# Patient Record
Sex: FEMALE | Race: WHITE | Employment: OTHER | URBAN - METROPOLITAN AREA
[De-identification: names, ages, dates, MRNs, and addresses within clinical notes are randomized per-mention and may not be internally consistent; named-entity substitution may affect disease eponyms.]

---

## 2017-01-24 PROBLEM — E83.52 HYPERCALCEMIA: Status: ACTIVE | Noted: 2017-01-24

## 2017-05-16 ENCOUNTER — HOSPITAL ENCOUNTER (OUTPATIENT)
Dept: MAMMOGRAPHY | Age: 62
Discharge: HOME | End: 2017-05-16
Attending: INTERNAL MEDICINE
Payer: MEDICARE

## 2017-05-16 DIAGNOSIS — Z78.0 POSTMENOPAUSAL: ICD-10-CM

## 2017-05-16 DIAGNOSIS — Z12.31 ENCOUNTER FOR SCREENING MAMMOGRAM FOR MALIGNANT NEOPLASM OF BREAST: ICD-10-CM

## 2017-05-16 PROCEDURE — 77080 DXA BONE DENSITY AXIAL: CPT

## 2017-05-16 PROCEDURE — 77067 SCR MAMMO BI INCL CAD: CPT

## 2017-05-22 PROBLEM — E55.9 VITAMIN D DEFICIENCY: Status: ACTIVE | Noted: 2017-05-22

## 2018-03-01 ENCOUNTER — HOSPITAL ENCOUNTER (OUTPATIENT)
Dept: LAB | Age: 63
Discharge: HOME | End: 2018-03-01
Payer: MEDICARE

## 2018-03-01 DIAGNOSIS — E55.9 VITAMIN D DEFICIENCY: ICD-10-CM

## 2018-03-01 DIAGNOSIS — E03.9 PRIMARY HYPOTHYROIDISM: ICD-10-CM

## 2018-03-01 DIAGNOSIS — E21.0 PRIMARY HYPERPARATHYROIDISM (HCC): ICD-10-CM

## 2018-03-01 PROBLEM — N18.30 STAGE 3 CHRONIC KIDNEY DISEASE (HCC): Status: ACTIVE | Noted: 2018-03-01

## 2018-03-01 PROBLEM — N20.0 KIDNEY STONES: Status: ACTIVE | Noted: 2018-03-01

## 2018-03-01 LAB
ALBUMIN SERPL-MCNC: 3.3 G/DL (ref 3.2–4.6)
ALBUMIN/GLOB SERPL: 0.6 {RATIO}
ALP SERPL-CCNC: 160 U/L (ref 50–136)
ALT SERPL-CCNC: 17 U/L (ref 12–65)
ANION GAP SERPL CALC-SCNC: 6 MMOL/L
AST SERPL-CCNC: 16 U/L (ref 15–37)
BILIRUB SERPL-MCNC: 0.4 MG/DL (ref 0.2–1.1)
BUN SERPL-MCNC: 39 MG/DL (ref 8–23)
CA-I BLD-MCNC: 5.52 MG/DL (ref 4–5.2)
CALCIUM SERPL-MCNC: 10.1 MG/DL (ref 8.3–10.4)
CALCIUM SERPL-MCNC: 10.1 MG/DL (ref 8.3–10.4)
CHLORIDE SERPL-SCNC: 103 MMOL/L (ref 98–107)
CO2 SERPL-SCNC: 28 MMOL/L (ref 21–32)
CREAT SERPL-MCNC: 1.8 MG/DL (ref 0.6–1)
GLOBULIN SER CALC-MCNC: 5.2 G/DL
GLUCOSE SERPL-MCNC: 132 MG/DL (ref 65–100)
PHOSPHATE SERPL-MCNC: 2.9 MG/DL (ref 2.3–3.7)
POTASSIUM SERPL-SCNC: 4.5 MMOL/L (ref 3.5–5.1)
PROT SERPL-MCNC: 8.5 G/DL (ref 6.3–8.2)
PTH-INTACT SERPL-MCNC: 278.4 PG/ML (ref 14–72)
SODIUM SERPL-SCNC: 137 MMOL/L (ref 136–145)
T4 FREE SERPL-MCNC: 1.6 NG/DL (ref 0.78–1.46)
TSH W FREE THYROID I,TSHELE: 13.8 UIU/ML

## 2018-03-01 PROCEDURE — 82306 VITAMIN D 25 HYDROXY: CPT | Performed by: INTERNAL MEDICINE

## 2018-03-01 PROCEDURE — 83970 ASSAY OF PARATHORMONE: CPT | Performed by: INTERNAL MEDICINE

## 2018-03-01 PROCEDURE — 36415 COLL VENOUS BLD VENIPUNCTURE: CPT | Performed by: INTERNAL MEDICINE

## 2018-03-01 PROCEDURE — 80053 COMPREHEN METABOLIC PANEL: CPT | Performed by: INTERNAL MEDICINE

## 2018-03-01 PROCEDURE — 84100 ASSAY OF PHOSPHORUS: CPT | Performed by: INTERNAL MEDICINE

## 2018-03-01 PROCEDURE — 84443 ASSAY THYROID STIM HORMONE: CPT | Performed by: INTERNAL MEDICINE

## 2018-03-01 PROCEDURE — 84439 ASSAY OF FREE THYROXINE: CPT | Performed by: INTERNAL MEDICINE

## 2018-03-01 PROCEDURE — 82330 ASSAY OF CALCIUM: CPT | Performed by: INTERNAL MEDICINE

## 2018-03-02 LAB — 25(OH)D3+25(OH)D2 SERPL-MCNC: 32.6 NG/ML (ref 30–100)

## 2018-03-21 ENCOUNTER — HOSPITAL ENCOUNTER (OUTPATIENT)
Dept: ULTRASOUND IMAGING | Age: 63
Discharge: HOME | End: 2018-03-21
Attending: INTERNAL MEDICINE
Payer: MEDICARE

## 2018-03-21 ENCOUNTER — HOSPITAL ENCOUNTER (OUTPATIENT)
Dept: NUCLEAR MEDICINE | Age: 63
Discharge: HOME | End: 2018-03-21
Attending: INTERNAL MEDICINE
Payer: MEDICARE

## 2018-03-21 DIAGNOSIS — E21.0 PRIMARY HYPERPARATHYROIDISM (HCC): ICD-10-CM

## 2018-03-21 PROCEDURE — 76536 US EXAM OF HEAD AND NECK: CPT

## 2018-03-21 PROCEDURE — 78070 PARATHYROID PLANAR IMAGING: CPT

## 2018-03-22 PROBLEM — Z85.42 HX OF CANCER OF UTERUS: Status: ACTIVE | Noted: 2018-03-22

## 2018-03-26 PROBLEM — E11.21 TYPE 2 DIABETES WITH NEPHROPATHY (HCC): Status: ACTIVE | Noted: 2018-03-26

## 2018-04-11 ENCOUNTER — HOSPITAL ENCOUNTER (OUTPATIENT)
Dept: MRI IMAGING | Age: 63
Discharge: HOME | End: 2018-04-11
Attending: OTOLARYNGOLOGY
Payer: MEDICARE

## 2018-04-11 VITALS — WEIGHT: 267 LBS | BODY MASS INDEX: 50.45 KG/M2

## 2018-04-11 DIAGNOSIS — E21.3 HYPERPARATHYROIDISM (HCC): ICD-10-CM

## 2018-04-11 DIAGNOSIS — D35.1 PARATHYROID ADENOMA: ICD-10-CM

## 2018-04-11 PROCEDURE — A9577 INJ MULTIHANCE: HCPCS | Performed by: OTOLARYNGOLOGY

## 2018-04-11 PROCEDURE — 74011250636 HC RX REV CODE- 250/636: Performed by: OTOLARYNGOLOGY

## 2018-04-11 PROCEDURE — 70543 MRI ORBT/FAC/NCK W/O &W/DYE: CPT

## 2018-04-11 RX ORDER — SODIUM CHLORIDE 0.9 % (FLUSH) 0.9 %
10 SYRINGE (ML) INJECTION
Status: COMPLETED | OUTPATIENT
Start: 2018-04-11 | End: 2018-04-11

## 2018-04-11 RX ADMIN — GADOBENATE DIMEGLUMINE 20 ML: 529 INJECTION, SOLUTION INTRAVENOUS at 12:47

## 2018-04-11 RX ADMIN — Medication 10 ML: at 12:47

## 2018-05-07 ENCOUNTER — ANESTHESIA EVENT (OUTPATIENT)
Dept: SURGERY | Age: 63
End: 2018-05-07
Payer: MEDICARE

## 2018-05-08 ENCOUNTER — HOSPITAL ENCOUNTER (OUTPATIENT)
Age: 63
Setting detail: OUTPATIENT SURGERY
Discharge: HOME | End: 2018-05-08
Attending: OTOLARYNGOLOGY | Admitting: OTOLARYNGOLOGY
Payer: MEDICARE

## 2018-05-08 ENCOUNTER — ANESTHESIA (OUTPATIENT)
Dept: SURGERY | Age: 63
End: 2018-05-08
Payer: MEDICARE

## 2018-05-08 VITALS
HEART RATE: 78 BPM | HEIGHT: 61 IN | OXYGEN SATURATION: 99 % | RESPIRATION RATE: 18 BRPM | BODY MASS INDEX: 50.41 KG/M2 | DIASTOLIC BLOOD PRESSURE: 77 MMHG | WEIGHT: 267 LBS | SYSTOLIC BLOOD PRESSURE: 184 MMHG | TEMPERATURE: 98.6 F

## 2018-05-08 LAB
ATRIAL RATE: 66 BPM
CALCIUM SERPL-MCNC: 11.7 MG/DL (ref 8.3–10.4)
CALCULATED P AXIS, ECG09: -20 DEGREES
CALCULATED R AXIS, ECG10: 9 DEGREES
CALCULATED T AXIS, ECG11: 7 DEGREES
CREAT SERPL-MCNC: 1.73 MG/DL (ref 0.6–1)
DIAGNOSIS, 93000: NORMAL
GLUCOSE BLD STRIP.AUTO-MCNC: 124 MG/DL (ref 65–100)
HGB BLD-MCNC: 13.2 G/DL (ref 11.7–15.4)
P-R INTERVAL, ECG05: 172 MS
POTASSIUM SERPL-SCNC: 3.9 MMOL/L (ref 3.5–5.1)
PTH, BASELINE, INTRA-OP, IPTH1T: 224.5 PG/ML (ref 18.5–88)
PTH, ZERO TIME, IPTH2T: 26.8 PG/ML (ref 18.5–88)
Q-T INTERVAL, ECG07: 392 MS
QRS DURATION, ECG06: 86 MS
QTC CALCULATION (BEZET), ECG08: 410 MS
SPECIMEN DRAWN SERPL: 1315
SPECIMEN DRAWN SERPL: 1418
VENTRICULAR RATE, ECG03: 66 BPM

## 2018-05-08 PROCEDURE — 77030021678 HC GLIDESCP STAT DISP VERT -B: Performed by: ANESTHESIOLOGY

## 2018-05-08 PROCEDURE — 77030013794 HC KT TRNSDUC BLD EDWD -B: Performed by: ANESTHESIOLOGY

## 2018-05-08 PROCEDURE — 77030019655 HC PRB STIM CRAN MEDT -B: Performed by: OTOLARYNGOLOGY

## 2018-05-08 PROCEDURE — 74011000250 HC RX REV CODE- 250

## 2018-05-08 PROCEDURE — 77030016570 HC BLNKT BAIR HGGR 3M -B: Performed by: ANESTHESIOLOGY

## 2018-05-08 PROCEDURE — 77030002996 HC SUT SLK J&J -A: Performed by: OTOLARYNGOLOGY

## 2018-05-08 PROCEDURE — 77030031139 HC SUT VCRL2 J&J -A: Performed by: OTOLARYNGOLOGY

## 2018-05-08 PROCEDURE — 74011250636 HC RX REV CODE- 250/636: Performed by: OTOLARYNGOLOGY

## 2018-05-08 PROCEDURE — 77030002933 HC SUT MCRYL J&J -A: Performed by: OTOLARYNGOLOGY

## 2018-05-08 PROCEDURE — 76010000172 HC OR TIME 2.5 TO 3 HR INTENSV-TIER 1: Performed by: OTOLARYNGOLOGY

## 2018-05-08 PROCEDURE — 74011250636 HC RX REV CODE- 250/636

## 2018-05-08 PROCEDURE — 74011000250 HC RX REV CODE- 250: Performed by: OTOLARYNGOLOGY

## 2018-05-08 PROCEDURE — 85018 HEMOGLOBIN: CPT | Performed by: ANESTHESIOLOGY

## 2018-05-08 PROCEDURE — 88305 TISSUE EXAM BY PATHOLOGIST: CPT | Performed by: OTOLARYNGOLOGY

## 2018-05-08 PROCEDURE — 88331 PATH CONSLTJ SURG 1 BLK 1SPC: CPT | Performed by: OTOLARYNGOLOGY

## 2018-05-08 PROCEDURE — 76210000021 HC REC RM PH II 0.5 TO 1 HR: Performed by: OTOLARYNGOLOGY

## 2018-05-08 PROCEDURE — 77030002974 HC SUT PLN J&J -A: Performed by: OTOLARYNGOLOGY

## 2018-05-08 PROCEDURE — 93005 ELECTROCARDIOGRAM TRACING: CPT | Performed by: ANESTHESIOLOGY

## 2018-05-08 PROCEDURE — 77030008467 HC STPLR SKN COVD -B: Performed by: OTOLARYNGOLOGY

## 2018-05-08 PROCEDURE — 83970 ASSAY OF PARATHORMONE: CPT | Performed by: OTOLARYNGOLOGY

## 2018-05-08 PROCEDURE — 76060000037 HC ANESTHESIA 3 TO 3.5 HR: Performed by: OTOLARYNGOLOGY

## 2018-05-08 PROCEDURE — 74011250637 HC RX REV CODE- 250/637: Performed by: ANESTHESIOLOGY

## 2018-05-08 PROCEDURE — 82962 GLUCOSE BLOOD TEST: CPT

## 2018-05-08 PROCEDURE — 77030005401 HC CATH RAD ARRO -A: Performed by: ANESTHESIOLOGY

## 2018-05-08 PROCEDURE — 76210000006 HC OR PH I REC 0.5 TO 1 HR: Performed by: OTOLARYNGOLOGY

## 2018-05-08 PROCEDURE — 77030019908 HC STETH ESOPH SIMS -A: Performed by: ANESTHESIOLOGY

## 2018-05-08 PROCEDURE — 77030013292 HC BOWL MX PRSM J&J -A: Performed by: ANESTHESIOLOGY

## 2018-05-08 PROCEDURE — 84132 ASSAY OF SERUM POTASSIUM: CPT | Performed by: ANESTHESIOLOGY

## 2018-05-08 PROCEDURE — 77030020782 HC GWN BAIR PAWS FLX 3M -B: Performed by: ANESTHESIOLOGY

## 2018-05-08 PROCEDURE — 74011250636 HC RX REV CODE- 250/636: Performed by: ANESTHESIOLOGY

## 2018-05-08 PROCEDURE — 82310 ASSAY OF CALCIUM: CPT | Performed by: ANESTHESIOLOGY

## 2018-05-08 PROCEDURE — 82565 ASSAY OF CREATININE: CPT | Performed by: ANESTHESIOLOGY

## 2018-05-08 RX ORDER — SODIUM CHLORIDE 0.9 % (FLUSH) 0.9 %
5-10 SYRINGE (ML) INJECTION AS NEEDED
Status: DISCONTINUED | OUTPATIENT
Start: 2018-05-08 | End: 2018-05-08 | Stop reason: HOSPADM

## 2018-05-08 RX ORDER — LIDOCAINE HYDROCHLORIDE 10 MG/ML
0.1 INJECTION INFILTRATION; PERINEURAL AS NEEDED
Status: DISCONTINUED | OUTPATIENT
Start: 2018-05-08 | End: 2018-05-08 | Stop reason: HOSPADM

## 2018-05-08 RX ORDER — ONDANSETRON 2 MG/ML
INJECTION INTRAMUSCULAR; INTRAVENOUS AS NEEDED
Status: DISCONTINUED | OUTPATIENT
Start: 2018-05-08 | End: 2018-05-08 | Stop reason: HOSPADM

## 2018-05-08 RX ORDER — FENTANYL CITRATE 50 UG/ML
100 INJECTION, SOLUTION INTRAMUSCULAR; INTRAVENOUS ONCE
Status: DISCONTINUED | OUTPATIENT
Start: 2018-05-08 | End: 2018-05-08 | Stop reason: HOSPADM

## 2018-05-08 RX ORDER — HYDROCODONE BITARTRATE AND ACETAMINOPHEN 5; 325 MG/1; MG/1
1 TABLET ORAL AS NEEDED
Status: DISCONTINUED | OUTPATIENT
Start: 2018-05-08 | End: 2018-05-08 | Stop reason: HOSPADM

## 2018-05-08 RX ORDER — ACETAMINOPHEN 500 MG
1000 TABLET ORAL
Status: DISCONTINUED | OUTPATIENT
Start: 2018-05-08 | End: 2018-05-08 | Stop reason: HOSPADM

## 2018-05-08 RX ORDER — CEFAZOLIN SODIUM/WATER 2 G/20 ML
2 SYRINGE (ML) INTRAVENOUS ONCE
Status: COMPLETED | OUTPATIENT
Start: 2018-05-08 | End: 2018-05-08

## 2018-05-08 RX ORDER — SODIUM CHLORIDE, SODIUM LACTATE, POTASSIUM CHLORIDE, CALCIUM CHLORIDE 600; 310; 30; 20 MG/100ML; MG/100ML; MG/100ML; MG/100ML
150 INJECTION, SOLUTION INTRAVENOUS CONTINUOUS
Status: DISCONTINUED | OUTPATIENT
Start: 2018-05-08 | End: 2018-05-08 | Stop reason: HOSPADM

## 2018-05-08 RX ORDER — PROPOFOL 10 MG/ML
INJECTION, EMULSION INTRAVENOUS AS NEEDED
Status: DISCONTINUED | OUTPATIENT
Start: 2018-05-08 | End: 2018-05-08 | Stop reason: HOSPADM

## 2018-05-08 RX ORDER — SODIUM CHLORIDE 9 MG/ML
50 INJECTION, SOLUTION INTRAVENOUS CONTINUOUS
Status: DISCONTINUED | OUTPATIENT
Start: 2018-05-08 | End: 2018-05-08 | Stop reason: HOSPADM

## 2018-05-08 RX ORDER — SODIUM CHLORIDE 0.9 % (FLUSH) 0.9 %
5-10 SYRINGE (ML) INJECTION EVERY 8 HOURS
Status: DISCONTINUED | OUTPATIENT
Start: 2018-05-08 | End: 2018-05-08 | Stop reason: HOSPADM

## 2018-05-08 RX ORDER — LIDOCAINE HYDROCHLORIDE 20 MG/ML
INJECTION, SOLUTION EPIDURAL; INFILTRATION; INTRACAUDAL; PERINEURAL AS NEEDED
Status: DISCONTINUED | OUTPATIENT
Start: 2018-05-08 | End: 2018-05-08 | Stop reason: HOSPADM

## 2018-05-08 RX ORDER — SODIUM CHLORIDE, SODIUM LACTATE, POTASSIUM CHLORIDE, CALCIUM CHLORIDE 600; 310; 30; 20 MG/100ML; MG/100ML; MG/100ML; MG/100ML
150 INJECTION, SOLUTION INTRAVENOUS CONTINUOUS
Status: DISCONTINUED | OUTPATIENT
Start: 2018-05-08 | End: 2018-05-08

## 2018-05-08 RX ORDER — SUCCINYLCHOLINE CHLORIDE 20 MG/ML
INJECTION INTRAMUSCULAR; INTRAVENOUS AS NEEDED
Status: DISCONTINUED | OUTPATIENT
Start: 2018-05-08 | End: 2018-05-08 | Stop reason: HOSPADM

## 2018-05-08 RX ORDER — HYDROMORPHONE HYDROCHLORIDE 2 MG/ML
0.5 INJECTION, SOLUTION INTRAMUSCULAR; INTRAVENOUS; SUBCUTANEOUS
Status: DISCONTINUED | OUTPATIENT
Start: 2018-05-08 | End: 2018-05-08 | Stop reason: HOSPADM

## 2018-05-08 RX ORDER — MIDAZOLAM HYDROCHLORIDE 1 MG/ML
2 INJECTION, SOLUTION INTRAMUSCULAR; INTRAVENOUS
Status: DISCONTINUED | OUTPATIENT
Start: 2018-05-08 | End: 2018-05-08 | Stop reason: HOSPADM

## 2018-05-08 RX ORDER — FENTANYL CITRATE 50 UG/ML
INJECTION, SOLUTION INTRAMUSCULAR; INTRAVENOUS AS NEEDED
Status: DISCONTINUED | OUTPATIENT
Start: 2018-05-08 | End: 2018-05-08 | Stop reason: HOSPADM

## 2018-05-08 RX ORDER — FAMOTIDINE 20 MG/1
20 TABLET, FILM COATED ORAL ONCE
Status: COMPLETED | OUTPATIENT
Start: 2018-05-08 | End: 2018-05-08

## 2018-05-08 RX ORDER — LABETALOL HYDROCHLORIDE 5 MG/ML
INJECTION, SOLUTION INTRAVENOUS AS NEEDED
Status: DISCONTINUED | OUTPATIENT
Start: 2018-05-08 | End: 2018-05-08 | Stop reason: HOSPADM

## 2018-05-08 RX ORDER — SODIUM CHLORIDE 9 MG/ML
75 INJECTION, SOLUTION INTRAVENOUS CONTINUOUS
Status: DISCONTINUED | OUTPATIENT
Start: 2018-05-08 | End: 2018-05-08 | Stop reason: HOSPADM

## 2018-05-08 RX ORDER — LIDOCAINE HYDROCHLORIDE AND EPINEPHRINE 10; 10 MG/ML; UG/ML
INJECTION, SOLUTION INFILTRATION; PERINEURAL AS NEEDED
Status: DISCONTINUED | OUTPATIENT
Start: 2018-05-08 | End: 2018-05-08 | Stop reason: HOSPADM

## 2018-05-08 RX ADMIN — LABETALOL HYDROCHLORIDE 10 MG: 5 INJECTION, SOLUTION INTRAVENOUS at 15:25

## 2018-05-08 RX ADMIN — FENTANYL CITRATE 25 MCG: 50 INJECTION, SOLUTION INTRAMUSCULAR; INTRAVENOUS at 14:50

## 2018-05-08 RX ADMIN — PROPOFOL 200 MG: 10 INJECTION, EMULSION INTRAVENOUS at 12:51

## 2018-05-08 RX ADMIN — SODIUM CHLORIDE 75 ML/HR: 900 INJECTION, SOLUTION INTRAVENOUS at 12:21

## 2018-05-08 RX ADMIN — SUCCINYLCHOLINE CHLORIDE 160 MG: 20 INJECTION INTRAMUSCULAR; INTRAVENOUS at 12:52

## 2018-05-08 RX ADMIN — Medication 2 G: at 13:07

## 2018-05-08 RX ADMIN — LIDOCAINE HYDROCHLORIDE 60 MG: 20 INJECTION, SOLUTION EPIDURAL; INFILTRATION; INTRACAUDAL; PERINEURAL at 12:50

## 2018-05-08 RX ADMIN — FENTANYL CITRATE 100 MCG: 50 INJECTION, SOLUTION INTRAMUSCULAR; INTRAVENOUS at 12:49

## 2018-05-08 RX ADMIN — ONDANSETRON 4 MG: 2 INJECTION INTRAMUSCULAR; INTRAVENOUS at 15:10

## 2018-05-08 RX ADMIN — FAMOTIDINE 20 MG: 20 TABLET ORAL at 11:04

## 2018-05-08 RX ADMIN — MIDAZOLAM HYDROCHLORIDE 2 MG: 1 INJECTION, SOLUTION INTRAMUSCULAR; INTRAVENOUS at 11:42

## 2018-05-08 RX ADMIN — SODIUM CHLORIDE, SODIUM LACTATE, POTASSIUM CHLORIDE, AND CALCIUM CHLORIDE 150 ML/HR: 600; 310; 30; 20 INJECTION, SOLUTION INTRAVENOUS at 10:55

## 2018-05-08 RX ADMIN — LABETALOL HYDROCHLORIDE 10 MG: 5 INJECTION, SOLUTION INTRAVENOUS at 15:27

## 2018-05-08 RX ADMIN — HYDROCODONE BITARTRATE AND ACETAMINOPHEN 1 TABLET: 5; 325 TABLET ORAL at 16:10

## 2018-05-08 NOTE — ANESTHESIA POSTPROCEDURE EVALUATION
Post-Anesthesia Evaluation and Assessment    Patient: Cinda Nunez MRN: [id number]  SSN: [id number]    YOB: 1955  Age: 60 y.o. Sex: female       Cardiovascular Function/Vital Signs  Visit Vitals    /77 (BP 1 Location: Left arm, BP Patient Position: At rest)    Pulse 78    Temp 37 °C (98.6 °F)    Resp 18    Ht 5' 1\" (1.549 m)    Wt 121.1 kg (267 lb)    SpO2 99%    BMI 50.45 kg/m2       Patient is status post general anesthesia for Procedure(s):  PARA THYROIDECTOMY/ NIMS/ ENTRAOPERATIVE PTH MONITORING. Nausea/Vomiting: None    Postoperative hydration reviewed and adequate. Pain:  Pain Scale 1: Numeric (0 - 10) (05/08/18 1622)  Pain Intensity 1: 2 (05/08/18 1622)   Managed    Neurological Status:   Neuro (WDL): Exceptions to WDL (05/08/18 1622)  Neuro  LUE Motor Response: Purposeful (05/08/18 1622)  LLE Motor Response: Weak (05/08/18 1622)  RUE Motor Response: Purposeful (05/08/18 1622)  RLE Motor Response: Weak (05/08/18 1622)   At baseline    Mental Status and Level of Consciousness: Arousable    Pulmonary Status:   O2 Device: Room air (05/08/18 1622)   Adequate oxygenation and airway patent    Complications related to anesthesia: None    Post-anesthesia assessment completed.  No concerns    Signed By: Hair Amaro MD     May 8, 2018

## 2018-05-08 NOTE — H&P
61 yo female w/ recently dx primary HPTH- noted initially to have hypercalcemia on routine blood work- Ca: 10.7. Further worked up by Dr. Jose Negro and her PTH was 278.4 and he dx her w/ primary HPTH. She has noted some fatigue, has h/o nephrolithiasis (one which passed and another req'd lithotripsy), and c/o some abd pain and constipation (although she does have 2 abd hernias). No previous thyroid or parathyroid surgery. She was worked up recently w/ thyroid US and Sestamibi scan. She does have chronic pain and uses a wheelchair. Past Medical History:   Diagnosis Date    Abnormal hemoglobin (Hgb) ([facility]     pt reports hgb of 9 at discharge from [address] [place] [address] 5-15-12; requested records    Adenosquamous carcinoma of uterus ([facility] 11/20/2012    Arthritis     Asthma     uses MDI    Cancer ([facility] [facility] 2010    uterine    Cellulitis     legs    Charcot's joint     left foot    Chronic kidney disease     Stage 3 per patient, followed by PCP--Dr. Gonzalez Wallace    Chronic LBP 11/20/2012    Chronic pain     abdomen, shoulders, feet    Dehydration     May 11,2012 -hospitialized    Depression     Diabetes ([facility]     Diabetes-she stopped all medications spring 2017. A1C has remained <6.5.  Diverticulosis 11/20/2012    Generalized pain 11/20/2012    History of kidney stones     passed stone x 2    Hypothyroidism 11/20/2012    Impaired mobility     Charcot's. Uses motorized wheelchair.  Lymphedema     lower legs    Morbid obesity ([facility]     MRSA infection     back after spinal fusion April 2007 -SFDT    Nerve pain     feet, hands    Peripheral neuropathy 11/20/2012    Renal failure, acute [facility])     hospitalized May 11-15,2012    Restless leg syndrome     medication    Sepsis(995.91)     May, 2012.   Ruptured diverticulum    Sleep apnea 11/20/2012    Thyroid disease     hypothyroid -medication    Unspecified adverse effect of anesthesia     reports difficulty extubating after surgery in 2003--has had surgeries since with no difficulty    Unspecified sleep apnea     c-pap -instruceted to bring per Dr Howard Yates UTI (lower urinary tract infection)     May, 2012    Venous stasis     lower extremity     Past Surgical History:   Procedure Laterality Date    HX APPENDECTOMY      with colostomy revision    HX CARPAL TUNNEL RELEASE  6/5/12    Right - SFE    HX CARPAL TUNNEL RELEASE      HX COLECTOMY  2003    ruptured diverticulum with colostomy with reversal in July    HX COLOSTOMY  03/01/2003    Dr. Ailyn Hayden - [facility]  2010    uterine biopsy    HX HYSTERECTOMY  2010    and BSO dr. Gabriel Swain  2-1-07    MRSA infection, osteomyelitis    HX OPEN CHOLECYSTECTOMY  1986    HX ORTHOPAEDIC  2004    right TKA    HX ORTHOPAEDIC  2005    right ORIF with revision later same year    HX ORTHOPAEDIC  2008    left foot fusion    HX OTHER SURGICAL  2007    spinal fusion     Social History     Social History    Marital status:      Spouse name: N/A    Number of children: 1    Years of education: N/A     Occupational History    Not on file. Social History Main Topics    Smoking status: Former Smoker     Quit date: 6/1/1986    Smokeless tobacco: Never Used      Comment: was a social smoker. Smoked off and on ten years    Alcohol use 0.0 oz/week     0 Standard drinks or equivalent per week      Comment: socially    Drug use: No    Sexual activity: Not on file     Other Topics Concern    Not on file     Social History Narrative    dsiabled RN    Twice     1 son    D/c tob 25                     Family History   Problem Relation Age of Onset    Arthritis-osteo Mother      rheumatoid arthritis    Heart Disease Father     Diabetes Father     Breast Cancer Maternal Aunt 48    Cancer Maternal Aunt      breast    Thyroid Cancer Paternal Aunt      Allergies   Allergen Reactions    Ativan [Lorazepam] Other (comments)     Pt states it made her feel nervous.     Meperidine Anxiety    Tequin [Gatifloxacin] Rash     No current facility-administered medications on file prior to encounter. Current Outpatient Prescriptions on File Prior to Encounter   Medication Sig Dispense Refill    silver sulfADIAZINE (SILVADENE) 1 % topical cream Apply  to affected area daily. 400 g 3    lidocaine (LIDOCAINE VISCOUS) 2 % solution Apply to affected area as needed. 100 mL 3    triamterene-hydroCHLOROthiazide (MAXZIDE) 75-50 mg per tablet Take 0.5 Tabs by mouth daily. 1 Tab 0    nystatin (MYCOSTATIN) topical cream Apply  to affected area two (2) times a day. 90 g 2    oxybutynin chloride XL (DITROPAN XL) 15 mg CR tablet Take 1 Tab by mouth two (2) times a day. 1 Tab 0    levothyroxine (SYNTHROID) 300 mcg tablet Take 1 Tab by mouth Daily (before breakfast). 90 Tab 3    levothyroxine (SYNTHROID) 25 mcg tablet Take 1 Tab by mouth Daily (before breakfast). 90 Tab 3    ergocalciferol (ERGOCALCIFEROL) 50,000 unit capsule Take 1 Cap by mouth Once every 2 weeks. 6 Cap 3    diclofenac (VOLTAREN) 1 % gel Apply 4 g to affected area four (4) times daily. 300 g 3    polyethylene glycol (MIRALAX) 17 gram/dose powder Take 17 g by mouth daily. 1530 g 1    OTHER Two Batteries for an [employer] Powerchair. Dx Continued need for motorized wheechair 2 Each 0    OTHER custom chair cushion for motorized wheelchair. Single Chamber High Profile Roho Cushion 20x18 with Smart Check Capabilities & a cover . Dx:pressure ulcers, charcot joint 1 Each 0    sertraline (ZOLOFT) 50 mg tablet Take 1 Tab by mouth daily. 80 Tab 1    OTHER Diabetic shoes. Dx previous callus with ulcer 1 Each 0    pramipexole (MIRAPEX) 1 mg tablet TAKE TWO TABLETS AT BEDTIME. Indications: Restless Legs Syndrome 180 Tab 1    fluticasone (FLONASE) 50 mcg/actuation nasal spray Use 1-2 sprays in each nostril twice a day 3 Bottle 3    glucose blood VI test strips (FREESTYLE LITE STRIPS) strip Patient test glucose QID.  Dx: E11.9 120 Strip 0    Non-Adherent Bandage (TELFA) 3 X 8 \" bndg Apply 1/2-1 patch over the affected areas after treatment daily 20 Each 2    cpap machine kit by Does Not Apply route.  HYDROcodone-acetaminophen (NORCO)  mg tablet as needed.  albuterol (PROVENTIL HFA, VENTOLIN HFA, PROAIR HFA) 90 mcg/actuation inhaler 1-2 inhalations four times daily. 1 Inhaler 1    mometasone (ASMANEX TWISTHALER) 220 mcg (120 doses) aepb inhaler Take 2 Puffs by inhalation two (2) times a day. 3 Inhaler 0    gabapentin (NEURONTIN) 600 mg tablet Take 1 Tab by mouth four (4) times daily. 360 Tab 3    morphine CR (MS CONTIN) 15 mg CR tablet Take 15 mg by mouth two (2) times a day. EXAM:  Visit Vitals    /75 (BP 1 Location: Left arm, BP Patient Position: At rest)    Pulse 93    Temp 98.8 °F (37.1 °C)    Resp 18    Ht 5' 1\" (1.549 m)    Wt 267 lb (121.1 kg)    SpO2 98%    BMI 50.45 kg/m2     General: NAD, well-appearing. Obese  Neuro: No gross neuro deficits. CN's II-XII intact. No facial weakness. Eyes: EOMI. Pupils reactive. No periorbital edema/ecchymosis. No nystagmus. Skin: No facial erythema, rashes or concerning lesions. Nose: No external deviations or saddling. Intranasally, septum is midline without perforations, nasal mucosa appears healthy with no erythema, mucopurulence, or polyps. Mouth: Moist mucus membranes, normal tongue/palate mobility, no concerning mucosal lesions. Oropharynx clear with no erythema/exudate, no tonsillar hypertrophy. Ears: Normal appearing auricles, no hematomas. EACs clear with no cerumen impaction, healthy canal skin, TM's intact with no perforations or retraction pockets. No middle ear effusions. Neck: Soft, supple, no palpable neck masses. No thyromegaly or palpable thyroid nodules. No surgical scars. Lymphatics: No palpable cervical LAD. Resp: No audible stridor or wheezing.   Extremities: No clubbing or cyanosis.     IMAGING:  I reviewed her recent Sestamibi scan from :     FINDINGS: On immediate imaging symmetric uptake is seen within salivary glands  and thyroid. The suprasternal notch is marked.       On delayed images there is no persisting abnormal activity to suggest a  parathyroid adenoma.       .  IMPRESSION: No evidence of parathyroid adenoma.      Ca: 10.1, 10.7  PTH: 278.4  TSH: 13.8    A/P:  She has signs and sxs of primary HPTH- her Sestamibi scan and MRI were not able to localize an obvious adenoma. I recommend proceeding w/ parathyroidectomy w/ NIMS and intra-operative PTH monitoring. I discussed all the risks of parathyroid surgery including bleeding/hematoma, infection, inability to find the adenoma or hyperplastic glands with continued hypercalcemia, post-op hypocalcemia, and hoarseness, and she would like to proceed.      Cathleen Ramirez MD  [facility]NT

## 2018-05-08 NOTE — OP NOTES
[facility] REPORT    Traci Lob  MR#: [id number]  : 1955  ACCOUNT #: [de-identified]   DATE OF SERVICE: 2018    PREOPERATIVE DIAGNOSIS:  Primary hyperparathyroidism. POSTOPERATIVE DIAGNOSES:  1. Primary hyperparathyroidism. 2.  Left inferior parathyroid adenoma. PROCEDURE:  Parathyroidectomy MD    OPERATIVE SURGEON:  Shanda Herrera MD    ANESTHESIA:  General endotracheal.    ANESTHESIOLOGIST:  BRIAN WRIGHT DO    OPERATIVE FINDINGS:  1. There was a firm and enlarged left inferior parathyroid gland, which was adherent to the left inferior thyroid pole. Frozen section pathology confirmed hypercellular parathyroid tissue. 2.  The intraoperative parathyroid hormone level decreased from 224.5 preoperatively down to 26.8 after excision of this adenoma. 3.  The left superior and right superior parathyroid glands were identified and appeared normal on gross inspection. 4.  Both recurrent laryngeal nerves were identified and carefully preserved. There were no major disruptions with intraoperative nerve monitoring. IV FLUID:  900 mL crystalloid. ESTIMATED BLOOD LOSS:  15 mL. URINE OUTPUT:  400. SPECIMENS:  Question left inferior parathyroid gland -- frozen. DRAINS:  None. COMPLICATIONS:  None. DISPOSITION:  PACU, then home. CONDITION:  Stable. BRIEF HISTORY:  The patient is a 59-year-old female who was referred to my office with a newly diagnosed primary hyperparathyroidism. She was noted to have hypercalcemia on routine blood work and her parathyroid hormone level was elevated as well. Due to her persistent hypercalcemia and symptoms, the decision was made to proceed with a parathyroidectomy. DESCRIPTION OF PROCEDURE:  The patient was brought back to the operating room and placed on the table in a supine position. General endotracheal anesthesia was inducted without complications.   A [employer] recurrent laryngeal nerve monitoring tube was placed and its position was confirmed using the GlideScope. Once the patient was adequately sedated and the neuro monitoring equipment was set up, a total of 6 mL of 1% lidocaine with 1:100,000 epinephrine was injected along the planned incision line. She was then sterilely prepped and draped in the usual fashion. I began by designing a 4 cm incision along a natural neck skin crease approximately one fingerbreadth above the sternal notch. I incised through the skin and dermis with a 15 blade and then dissected through subcutaneous fat and platysma muscle using Bovie electrocautery. Next, superior and inferiorly based subplatysmal skin flaps were raised for improved exposure. I dissected along the midline raphae to lateralize the strap muscles. I dissected down to a very small thyroid isthmus along the anterior tracheal wall. The preoperative imaging studies were not able to localize any obvious adenoma, so I began with the parathyroid exploration. I started on the left side. I used careful dissection to dissect the strap muscles off of the left thyroid lobe. I dissected laterally all the way to the carotid artery. The middle thyroid vein was ligated and divided. I carefully dissected out the superior pole, but left this vasculature intact. During this dissection superiorly, I noted a normal appearing left superior parathyroid gland just along the superolateral aspect of the thyroid gland. As I moved inferiorly down towards the inferior pole, I ligated and dividing several small feeder veins and I palpated an enlarged and slightly firm mass which appeared to be adherent to the left inferior pole of the thyroid gland. This was concerning for possible parathyroid adenoma. I used careful blunt dissection and bipolar electrocautery to carefully dissect it away from the gland.   It was excised with a small portion of fat and passed off for frozen section analysis labeled question left parathyroid gland. While awaiting frozen section results, I turned my attention to the right side. I again developed a dissection plane deep to the strap muscles to expose the right thyroid lobe. I dissected laterally towards the carotid artery. Middle thyroid vein was ligated and divided with silk sutures. Superiorly, I was able to dissect out the superior pole while leaving the vascular pedicle intact. During this dissection along the superior and lateral aspect of the gland I identified a slightly enlarged, but otherwise soft and normal appearing right superior parathyroid gland. This was left intact while awaiting frozen section results. Ten minutes after resection of the previously removed left inferior parathyroid gland a repeat parathyroid hormone level was drawn. The preoperative parathyroid hormone level returned as 224.5. As I was carefully dissecting inferior laterally on the right side surgical pathology called and confirmed a hypercellular parathyroid tissue within the previously removed likely left parathyroid gland. The 10 minute post-resection lab returned as 26.8 and therefore no further parathyroid removal was required. I irrigated out the wound bed on both sides and ensured adequate hemostasis. There were no disruptions during intraoperative nerve monitoring of either recurrent laryngeal nerve. Once hemostasis was ensured, I reapproximated the strap muscles in the midline with a running 3-0 Vicryl suture. The incision was then closed in layers using 3-0 undyed Vicryl deep sutures followed by 5-0 Monocryl in a running fashion for subcutaneous closure. Mastisol and Steri-Strips were placed over the incision. This concluded the surgical portion of the procedure. The patient was then awakened from anesthesia, extubated, and taken to the PACU in stable condition afterwards.       MD ANA Michael / CRISTO  D: 05/08/2018 15:31     T: 05/08/2018 16:06  JOB #: [id number]

## 2018-05-08 NOTE — BRIEF OP NOTE
BRIEF OPERATIVE NOTE    Date of Procedure: 5/8/2018   Preoperative Diagnosis: Hyperparathyroidism ([facility] [address] [E21.3]    Postoperative Diagnosis: Hyperparathyroidism ([facility] [address] [E21.3] , L inferior parathyroid adenoma    Procedure(s):  PARATHYROIDECTOMY/ NIMS/ iNTRAOPERATIVE PTH MONITORING    Surgeon(s) and Role:     * Eric Ybarra MD - Primary         Surgical Staff:  Circ-1: Shahram Saldana RN  Circ-Relief: Zaira Garcia RN  Scrub Tech-1: Ihor Riedel  Scrub Tech-2: [employer]    Event Time In   Incision Start 1516   Incision Close      Anesthesia: General     IVF: 900 cc    Estimated Blood Loss: 15 cc    UOP: 15 cc    Specimens:   ID Type Source Tests Collected by Time Destination   1 : question left inferior parathyroid gland Frozen Section Parathyroid  Eric Ybarra MD 5/8/2018 1353 Pathology      Findings: enlarged and hypercellular L inferior parathyroid gland- PTH decreased from 123.1 to 80.7    Complications: none    Implants: * No implants in log *

## 2018-05-08 NOTE — ANESTHESIA PREPROCEDURE EVALUATION
Anesthetic History          Comments: Was on vent one time. Review of Systems / Medical History  Patient summary reviewed, nursing notes reviewed and pertinent labs reviewed    Pulmonary        Sleep apnea: CPAP    Asthma (occas inhaler) : well controlled       Neuro/Psych   Within defined limits           Cardiovascular                  Exercise tolerance: <4 METS: Uses a wheelchair due to charcot joint.      GI/Hepatic/Renal         Renal disease: stones and CRI       Endo/Other    Diabetes: well controlled, type 2  Hypothyroidism: well controlled  Morbid obesity and arthritis     Other Findings            Physical Exam    Airway  Mallampati: II  TM Distance: 4 - 6 cm  Neck ROM: normal range of motion   Mouth opening: Normal     Cardiovascular    Rhythm: regular  Rate: normal         Dental    Dentition: Caps/crowns     Pulmonary  Breath sounds clear to auscultation               Abdominal         Other Findings            Anesthetic Plan    ASA: 3  Anesthesia type: general    Monitoring Plan: Arterial line      Induction: Intravenous  Anesthetic plan and risks discussed with: Patient and Son / Daughter

## 2018-05-08 NOTE — ANESTHESIA PROCEDURE NOTES
Arterial Line Placement    Start time: 5/8/2018 12:55 PM  End time: 5/8/2018 12:57 PM  Performed by: French Mosley  Authorized by: Mely Rod     Pre-Procedure  Indications:  Blood sampling  Preanesthetic Checklist: patient identified, risks and benefits discussed, anesthesia consent, site marked, patient being monitored, timeout performed and patient being monitored    Timeout Time: 12:55        Procedure:   Prep:  Chlorhexidine  Seldinger Technique?: Yes    Orientation:  Left  Location:  Radial artery  Catheter size:  20 G  Number of attempts:  1  Cont Cardiac Output Sensor: No      Assessment:   Post-procedure:  Line secured and sterile dressing applied  Patient Tolerance:  Patient tolerated the procedure well with no immediate complications  Comment:   Inserted by Omar Aragon

## 2018-05-08 NOTE — DISCHARGE INSTRUCTIONS
-There are steri-strips in place over your neck incision. These will be removed at your post-op visit. You may shower/bathe as long as these steri-strips remain out of the water.  -No strenuous activity or heavy lifting for 2 weeks  -Please start w/ soft foods and advance to a regular diet     A parathyroidectomy is the removal of one or more of the four parathyroid glands in the neck. These small glands, located on the thyroid gland, help control the amount of calcium in the body. When they are too active, these glands cause high levels of calcium. This is called hyperparathyroidism (say \"hy-per-pair-to-BLO-njxz-iz-um\"). You may have some trouble chewing and swallowing after you go home. Your voice probably will be hoarse, and you may have trouble talking. For most people, these problems get better within a few weeks, but it can take longer. In some cases, this surgery causes permanent problems with chewing, speaking, or swallowing. How can you care for yourself at home? Activity  ? · Rest when you feel tired. Getting enough sleep will help you recover. When you lie down, put two or three pillows under your head to keep it raised. ? · Try to walk each day. Start by walking a little more than you did the day before. Bit by bit, increase the amount you walk. Walking boosts blood flow and helps prevent pneumonia and constipation. ? · Avoid strenuous physical activity and lifting heavy objects for 3 weeks after surgery or until your doctor says it is okay. ? · Do not over-extend your neck backwards for 2 weeks after surgery. ? · Ask your doctor when you can drive again. ? · You may take a shower, unless you still have a drain. Pat the incision dry. If you have a drain coming out of your incision, follow your doctor's instructions to care for it. Diet  ?  · If swallowing is painful, start out with cold drinks, flavored ice pops, and ice cream. Next, try soft foods like pudding, yogurt, canned or cooked fruit, scrambled eggs, and mashed potatoes. Avoid eating hard or scratchy foods like chips or raw vegetables. Avoid orange or tomato juice and other acidic foods that can sting the throat. ? · If you cough right after drinking, try drinking thicker liquids, such as a smoothie. ? · You may notice that your bowel movements are not regular right after your surgery. This is common. Try to avoid constipation and straining with bowel movements. You may want to take a fiber supplement every day. If you have not had a bowel movement after a couple of days, ask your doctor about taking a mild laxative. Medicines  ? · Your doctor will tell you if and when you can restart your medicines. He or she will also give you instructions about taking any new medicines. ? · If you take blood thinners, such as warfarin (Coumadin), clopidogrel (Plavix), or aspirin, be sure to talk to your doctor. He or she will tell you if and when to start taking those medicines again. Make sure that you understand exactly what your doctor wants you to do. ? · Be safe with medicines. Take pain medicines exactly as directed. ¨ If the doctor gave you a prescription medicine for pain, take it as prescribed. ¨ If you are not taking a prescription pain medicine, ask your doctor if you can take an over-the-counter medicine. ? · If you think your pain medicine is making you sick to your stomach:  ¨ Take your medicine after meals (unless your doctor has told you not to). ¨ Ask your doctor for a different pain medicine. ? · Your doctor may prescribe calcium to prevent problems after surgery from low calcium. Not having enough calcium can cause symptoms such as tingling around your mouth or in your hands and feet. ? · Your doctor may have prescribed antibiotics. Take them as directed. Do not stop taking them just because you feel better. You need to take the full course of antibiotics. Incision care  ?  · If your doctor told you how to care for your incision, follow your doctor's instructions. If you did not get instructions, follow this general advice:  ¨ After the first 24 to 48 hours, wash around the incision with clean water 2 times a day. Don't use hydrogen peroxide or alcohol, which can slow healing. ? · You may have a drain near your incision. Your doctor will tell you how to take care of it. Follow-up care is a key part of your treatment and safety. Be sure to make and go to all appointments, and call your doctor if you are having problems. It's also a good idea to know your test results and keep a list of the medicines you take. When should you call for help? Call 911 anytime you think you may need emergency care. For example, call if:  ? · You passed out (lost consciousness). ? · You have sudden chest pain and shortness of breath, or you cough up blood. ? · You have severe trouble breathing. ?Call your doctor now or seek immediate medical care if:  ? · You have loose stitches, or your incision comes open. ? · You have blood leaking from your incision. ? · You have signs of infection, such as:  ¨ Increased pain, swelling, warmth, or redness. ¨ Red streaks leading from the incision. ¨ Pus draining from the incision. ¨ A fever. ? · You have a tingling feeling around your mouth. ? · You have cramping or tingling in your hands and feet. ? Watch closely for changes in your health, and be sure to contact your doctor if:  ? · You have trouble talking. ? · You are sick to your stomach or cannot keep fluids down. ? · You do not have a bowel movement after taking a laxative.      After general anesthesia or intravenous sedation, for 24 hours or while taking prescription Narcotics:  · Limit your activities  · Do not drive and operate hazardous machinery  · Do not make important personal or business decisions  · Do  not drink alcoholic beverages  · If you have not urinated within 8 hours after discharge, please contact your surgeon on call. *  Please give a list of your current medications to your Primary Care Provider. *  Please update this list whenever your medications are discontinued, doses are      changed, or new medications (including over-the-counter products) are added. *  Please carry medication information at all times in case of emergency situations. These are general instructions for a healthy lifestyle:  No smoking/ No tobacco products/ Avoid exposure to second hand smoke  Surgeon General's Warning:  Quitting smoking now greatly reduces serious risk to your health. Obesity, smoking, and sedentary lifestyle greatly increases your risk for illness  A healthy diet, regular physical exercise & weight monitoring are important for maintaining a healthy lifestyle    Recognize signs and symptoms of STROKE:  F-face looks uneven  A-arms unable to move or move unevenly  S-speech slurred or non-existent  T-time-call 911 as soon as signs and symptoms begin-DO NOT go       Back to bed or wait to see if you get better-TIME IS BRAIN.

## 2018-05-08 NOTE — IP AVS SNAPSHOT
Clayton Choi 
 
 
 [address] [facility] [address] 
[phone] Patient: Chirs Izquierdo MRN: [id number] About your hospitalization You were admitted on: May 8, 2018 You last received care in the:  [facility] PACU You were discharged on: May 8, 2018 Why you were hospitalized Your primary diagnosis was:  Not on File Follow-up Information Follow up With Details Comments Contact Info Micah Aguilera MD   [address] [facility] [address],[address] 
[phone] Your Scheduled Appointments Tuesday May 15, 2018 10:55 AM EDT  
POST OP with Kelly Fenton MD  
[facility]) [address] [place] [address]  
[phone] Discharge Orders None A check dany indicates which time of day the medication should be taken. My Medications CONTINUE taking these medications Instructions Each Dose to Equal  
 Morning Noon Evening Bedtime  
 albuterol 90 mcg/actuation inhaler Commonly known as:  PROVENTIL HFA, VENTOLIN HFA, PROAIR HFA Your last dose was: Your next dose is:    
   
   
 1-2 inhalations four times daily. cpap machine kit Your last dose was: Your next dose is:    
   
   
 by Does Not Apply route. diclofenac 1 % Gel Commonly known as:  VOLTAREN Your last dose was: Your next dose is:    
   
   
 Apply 4 g to affected area four (4) times daily. 4 g  
    
   
   
   
  
 ergocalciferol 50,000 unit capsule Commonly known as:  ERGOCALCIFEROL Your last dose was: Your next dose is: Take 1 Cap by mouth Once every 2 weeks. [address] Units  
    
   
   
   
  
 fluticasone 50 mcg/actuation nasal spray Commonly known as:  Diallo Sharp Your last dose was: Your next dose is: Use 1-2 sprays in each nostril twice a day  
     
   
   
   
  
 gabapentin 600 mg tablet Commonly known as:  NEURONTIN Your last dose was: Your next dose is: Take 1 Tab by mouth four (4) times daily. 600 mg  
    
   
   
   
  
 glucose blood VI test strips strip Commonly known as:  FREESTYLE LITE STRIPS Your last dose was: Your next dose is:    
   
   
 Patient test glucose QID. Dx: E11.9  
     
   
   
   
  
 * HYDROcodone-acetaminophen  mg tablet Commonly known as:  Ruffus Homme Your last dose was: Your next dose is:    
   
   
 as needed. * HYDROcodone-acetaminophen 5-325 mg per tablet Commonly known as:  Ruffus Homme Your last dose was: Your next dose is:    
   
   
 1-2 tab Q4-6 hours PRN pain * levothyroxine 300 mcg tablet Commonly known as:  SYNTHROID Your last dose was: Your next dose is: Take 1 Tab by mouth Daily (before breakfast). 300 mcg * levothyroxine 25 mcg tablet Commonly known as:  SYNTHROID Your last dose was: Your next dose is: Take 1 Tab by mouth Daily (before breakfast). 25 mcg  
    
   
   
   
  
 lidocaine 2 % solution Commonly known as:  LIDOCAINE VISCOUS Your last dose was: Your next dose is:    
   
   
 Apply to affected area as needed. mometasone 220 mcg (120 doses) Aepb inhaler Commonly known as:  Doree Kida Your last dose was: Your next dose is: Take 2 Puffs by inhalation two (2) times a day. 2 Puff MS CONTIN 15 mg CR tablet Generic drug:  morphine CR Your last dose was: Your next dose is: Take 15 mg by mouth two (2) times a day. 15 mg Non-Adherent Bandage 3 X 8 \" Bndg Commonly known as:  TELFA Your last dose was: Your next dose is:    
   
   
 Apply 1/2-1 patch over the affected areas after treatment daily  
     
   
   
   
  
 nystatin topical cream  
Commonly known as:  MYCOSTATIN Your last dose was: Your next dose is:    
   
   
 Apply  to affected area two (2) times a day. ondansetron 8 mg disintegrating tablet Commonly known as:  ZOFRAN ODT Your last dose was: Your next dose is:    
   
   
 1 tablet Q 8 hours PRN N/V  
     
   
   
   
  
 * OTHER Your last dose was: Your next dose is:    
   
   
 Diabetic shoes. Dx previous callus with ulcer * OTHER Your last dose was: Your next dose is:    
   
   
 custom chair cushion for motorized wheelchair. Single Chamber High Profile Roho Cushion 20x18 with Smart Check Capabilities & a cover . Dx:pressure ulcers, charcot joint * OTHER Your last dose was: Your next dose is:    
   
   
 Two Batteries for an [employer]. Dx Continued need for motorized wheechair  
     
   
   
   
  
 oxybutynin chloride XL 15 mg CR tablet Commonly known as:  DITROPAN XL Your last dose was: Your next dose is: Take 1 Tab by mouth two (2) times a day. 15 mg  
    
   
   
   
  
 polyethylene glycol 17 gram/dose powder Commonly known as:  Kathrin Hodgkin Your last dose was: Your next dose is: Take 17 g by mouth daily. 17 g  
    
   
   
   
  
 pramipexole 1 mg tablet Commonly known as:  MIRAPEX Your last dose was: Your next dose is: TAKE TWO TABLETS AT BEDTIME. Indications: Restless Legs Syndrome  
     
   
   
   
  
 sertraline 50 mg tablet Commonly known as:  ZOLOFT Your last dose was: Your next dose is: Take 1 Tab by mouth daily.   
 50 mg  
    
   
   
   
  
 silver sulfADIAZINE 1 % topical cream  
Commonly known as:  SILVADENE Your last dose was: Your next dose is:    
   
   
 Apply  to affected area daily. triamterene-hydroCHLOROthiazide 75-50 mg per tablet Commonly known as:  Gelene García Your last dose was: Your next dose is: Take 0.5 Tabs by mouth daily. 0.5 Tab * Notice: This list has 7 medication(s) that are the same as other medications prescribed for you. Read the directions carefully, and ask your doctor or other care provider to review them with you. Opioid Education Prescription Opioids: What You Need to Know: 
 
  
-There are steri-strips in place over your neck incision. These will be removed at your post-op visit. You may shower/bathe as long as these steri-strips remain out of the water. 
-No strenuous activity or heavy lifting for 2 weeks 
-Please start w/ soft foods and advance to a regular diet A parathyroidectomy is the removal of one or more of the four parathyroid glands in the neck. These small glands, located on the thyroid gland, help control the amount of calcium in the body. When they are too active, these glands cause high levels of calcium. This is called hyperparathyroidism (say \"hy-per-pair-xx-DNN-idil-iz-um\"). You may have some trouble chewing and swallowing after you go home. Your voice probably will be hoarse, and you may have trouble talking. For most people, these problems get better within a few weeks, but it can take longer. In some cases, this surgery causes permanent problems with chewing, speaking, or swallowing. How can you care for yourself at home? Activity ? · Rest when you feel tired. Getting enough sleep will help you recover. When you lie down, put two or three pillows under your head to keep it raised. ? · Try to walk each day. Start by walking a little more than you did the day before. Bit by bit, increase the amount you walk. Walking boosts blood flow and helps prevent pneumonia and constipation. ? · Avoid strenuous physical activity and lifting heavy objects for 3 weeks after surgery or until your doctor says it is okay. ? · Do not over-extend your neck backwards for 2 weeks after surgery. ? · Ask your doctor when you can drive again. ? · You may take a shower, unless you still have a drain. Pat the incision dry. If you have a drain coming out of your incision, follow your doctor's instructions to care for it. Diet ? · If swallowing is painful, start out with cold drinks, flavored ice pops, and ice cream. Next, try soft foods like pudding, yogurt, canned or cooked fruit, scrambled eggs, and mashed potatoes. Avoid eating hard or scratchy foods like chips or raw vegetables. Avoid orange or tomato juice and other acidic foods that can sting the throat. ? · If you cough right after drinking, try drinking thicker liquids, such as a smoothie. ? · You may notice that your bowel movements are not regular right after your surgery. This is common. Try to avoid constipation and straining with bowel movements. You may want to take a fiber supplement every day. If you have not had a bowel movement after a couple of days, ask your doctor about taking a mild laxative. Medicines ? · Your doctor will tell you if and when you can restart your medicines. He or she will also give you instructions about taking any new medicines. ? · If you take blood thinners, such as warfarin (Coumadin), clopidogrel (Plavix), or aspirin, be sure to talk to your doctor. He or she will tell you if and when to start taking those medicines again.  Make sure that you understand exactly what your doctor wants you to do. ? · Be safe with medicines. Take pain medicines exactly as directed. ¨ If the doctor gave you a prescription medicine for pain, take it as prescribed. ¨ If you are not taking a prescription pain medicine, ask your doctor if you can take an over-the-counter medicine. ? · If you think your pain medicine is making you sick to your stomach: 
¨ Take your medicine after meals (unless your doctor has told you not to). ¨ Ask your doctor for a different pain medicine. ? · Your doctor may prescribe calcium to prevent problems after surgery from low calcium. Not having enough calcium can cause symptoms such as tingling around your mouth or in your hands and feet. ? · Your doctor may have prescribed antibiotics. Take them as directed. Do not stop taking them just because you feel better. You need to take the full course of antibiotics. Incision care ? · If your doctor told you how to care for your incision, follow your doctor's instructions. If you did not get instructions, follow this general advice: ¨ After the first 24 to 48 hours, wash around the incision with clean water 2 times a day. Don't use hydrogen peroxide or alcohol, which can slow healing. ? · You may have a drain near your incision. Your doctor will tell you how to take care of it. Follow-up care is a key part of your treatment and safety. Be sure to make and go to all appointments, and call your doctor if you are having problems. It's also a good idea to know your test results and keep a list of the medicines you take. When should you call for help? Call 911 anytime you think you may need emergency care. For example, call if: 
? · You passed out (lost consciousness). ? · You have sudden chest pain and shortness of breath, or you cough up blood. ? · You have severe trouble breathing. ?Call your doctor now or seek immediate medical care if: ? · You have loose stitches, or your incision comes open. ? · You have blood leaking from your incision. ? · You have signs of infection, such as: 
¨ Increased pain, swelling, warmth, or redness. ¨ Red streaks leading from the incision. ¨ Pus draining from the incision. ¨ A fever. ? · You have a tingling feeling around your mouth. ? · You have cramping or tingling in your hands and feet. ? Watch closely for changes in your health, and be sure to contact your doctor if: 
? · You have trouble talking. ? · You are sick to your stomach or cannot keep fluids down. ? · You do not have a bowel movement after taking a laxative. After general anesthesia or intravenous sedation, for 24 hours or while taking prescription Narcotics: · Limit your activities · Do not drive and operate hazardous machinery · Do not make important personal or business decisions · Do  not drink alcoholic beverages · If you have not urinated within 8 hours after discharge, please contact your surgeon on call. *  Please give a list of your current medications to your Primary Care Provider. *  Please update this list whenever your medications are discontinued, doses are 
    changed, or new medications (including over-the-counter products) are added. *  Please carry medication information at all times in case of emergency situations. These are general instructions for a healthy lifestyle: No smoking/ No tobacco products/ Avoid exposure to second hand smoke Surgeon General's Warning:  Quitting smoking now greatly reduces serious risk to your health. Obesity, smoking, and sedentary lifestyle greatly increases your risk for illness A healthy diet, regular physical exercise & weight monitoring are important for maintaining a healthy lifestyle Recognize signs and symptoms of STROKE: 
F-face looks uneven A-arms unable to move or move unevenly S-speech slurred or non-existent T-time-call 911 as soon as signs and symptoms begin-DO NOT go Back to bed or wait to see if you get better-TIME IS BRAIN. Introducing [facility]! Dear Caitlin Manzo: 
Thank you for requesting a [employer] account. Our records indicate that you already have an active [employer] account. You can access your account anytime at [email or web address]. [email or web address]/[email or web address] Did you know that you can access your hospital and ER discharge instructions at any time in [employer]? You can also review all of your test results from your hospital stay or ER visit. Additional Information If you have questions, please visit the Frequently Asked Questions section of the [employer] website at [email or web address] Remember, [employer] is NOT to be used for urgent needs. For medical emergencies, dial 911. Now available from your iPhone and Android! Introducing Fabiano Dumont As a [facility] patient, I wanted to make you aware of our electronic visit tool called Fabiano Bridgersanchofannie. [facility] 24/7 allows you to connect within minutes with a medical provider 24 hours a day, seven days a week via a mobile device or tablet or logging into a secure website from your computer. You can access Fabiano Dumont from anywhere in the [place]. A virtual visit might be right for you when you have a simple condition and feel like you just dont want to get out of bed, or cant get away from work for an appointment, when your regular [facility] provider is not available (evenings, weekends or holidays), or when youre out of town and need minor care. Electronic visits cost only $49 and if the [facility] 24/7 provider determines a prescription is needed to treat your condition, one can be electronically transmitted to a nearby pharmacy*. Please take a moment to enroll today if you have not already done so. The enrollment process is free and takes just a few minutes.   To enroll, please download the [facility] 24/7 anjum to your tablet or phone, or visit [email or web address] to enroll on your computer. And, as an [address] [place] [address] patient with a [employer] account, the results of your visits will be scanned into your electronic medical record and your primary care provider will be able to view the scanned results. We urge you to continue to see your regular [facility] provider for your ongoing medical care. And while your primary care provider may not be the one available when you seek a [employer] virtual visit, the peace of mind you get from getting a real diagnosis real time can be priceless. For more information on [employer], view our Frequently Asked Questions (FAQs) at [email or web address]. Sincerely, 
 
Matias Lilly MD 
Chief Medical Officer [employer] *:  certain medications cannot be prescribed via [employer] Providers Seen During Your Hospitalization Provider Specialty Primary office phone Bhanu Dacosta MD Otolaryngology [phone] Your Primary Care Physician (PCP) Primary Care Physician Office Phone Office Fax Norma Reese 1521 [phone] You are allergic to the following Allergen Reactions Ativan (Lorazepam) Other (comments) Pt states it made her feel nervous. Meperidine Anxiety Tequin (Gatifloxacin) Rash Recent Documentation Height Weight BMI OB Status Smoking Status 1.549 m 121.1 kg 50.45 kg/m2 Hysterectomy Former Smoker Emergency Contacts Name Discharge Info Relation Home Work Mobile Sandra Bailey DISCHARGE CAREGIVER [3] Other Relative [6]   [phone] Navid Culp  Child [2]   [phone] Harry Liu [5] [phone] Patient Belongings The following personal items are in your possession at time of discharge: Dental Appliances: None  Visual Aid: Glasses          Jewelry: None  Clothing: Dress    Other Valuables: Other (comment), Eyeglasses (electric chair) Please provide this summary of care documentation to your next provider. Signatures-by signing, you are acknowledging that this After Visit Summary has been reviewed with you and you have received a copy. Patient Signature:  ____________________________________________________________ Date:  ____________________________________________________________  
  
Claire Spare Provider Signature:  ____________________________________________________________ Date:  ____________________________________________________________

## 2018-07-16 PROBLEM — E89.2 S/P PARATHYROIDECTOMY (HCC): Status: ACTIVE | Noted: 2018-07-16

## 2019-01-25 PROBLEM — N18.4 CKD (CHRONIC KIDNEY DISEASE) STAGE 4, GFR 15-29 ML/MIN (HCC): Status: ACTIVE | Noted: 2019-01-25

## 2019-05-29 PROBLEM — E11.40 TYPE 2 DIABETES MELLITUS WITH DIABETIC NEUROPATHY (HCC): Status: ACTIVE | Noted: 2019-05-29

## (undated) DEVICE — (D)SUT PLN FST 6-0 18IN PC1 -- DISC BY MFR

## (undated) DEVICE — MASTISOL ADHESIVE LIQ 2/3ML

## (undated) DEVICE — BIPOLAR FORCEPS CORD,BANANA LEADS: Brand: VALLEYLAB

## (undated) DEVICE — SUTURE PERMAHAND SZ 3-0 L18IN NONABSORBABLE BLK SILK BRAID A184H

## (undated) DEVICE — FORCEPS BPLR L210MM TIP L1.5 WRK L105MM STR BAYNT INSUL DISP

## (undated) DEVICE — Device

## (undated) DEVICE — SUTURE VCRL SZ 3-0 L27IN ABSRB UD L17MM RB-1 1/2 CIR J215H

## (undated) DEVICE — SPONGE: SPECIALTY PEANUT XR 100/CS: Brand: MEDICAL ACTION INDUSTRIES

## (undated) DEVICE — INTENDED FOR TISSUE SEPARATION, AND OTHER PROCEDURES THAT REQUIRE A SHARP SURGICAL BLADE TO PUNCTURE OR CUT.: Brand: BARD-PARKER ® STAINLESS STEEL BLADES

## (undated) DEVICE — SLIM BODY SKIN STAPLER: Brand: APPOSE ULC

## (undated) DEVICE — SUTURE PERMAHAND SZ 2-0 L18IN NONABSORBABLE BLK L26MM PS 1588H

## (undated) DEVICE — BLADE SURG NO15 S STL STR DISP GLASSVAN

## (undated) DEVICE — SUTURE MCRYL SZ 5-0 L18IN ABSRB UD L13MM P-3 3/8 CIR PRIM Y493G

## (undated) DEVICE — PROBE 8225101 5PK STD PRASS FL TIP ROHS

## (undated) DEVICE — (D)STRIP SKN CLSR 0.5X4IN WHT --

## (undated) DEVICE — HEAD AND NECK: Brand: MEDLINE INDUSTRIES, INC.

## (undated) DEVICE — SUTURE VCRL SZ 3-0 L27IN ABSRB UD L26MM SH 1/2 CIR J416H